# Patient Record
Sex: FEMALE | Race: WHITE | NOT HISPANIC OR LATINO
[De-identification: names, ages, dates, MRNs, and addresses within clinical notes are randomized per-mention and may not be internally consistent; named-entity substitution may affect disease eponyms.]

---

## 2024-09-17 PROBLEM — Z00.00 ENCOUNTER FOR PREVENTIVE HEALTH EXAMINATION: Status: ACTIVE | Noted: 2024-09-17

## 2024-09-18 ENCOUNTER — APPOINTMENT (OUTPATIENT)
Dept: ORTHOPEDIC SURGERY | Facility: CLINIC | Age: 64
End: 2024-09-18

## 2024-09-18 VITALS
SYSTOLIC BLOOD PRESSURE: 123 MMHG | BODY MASS INDEX: 22.58 KG/M2 | HEART RATE: 80 BPM | HEIGHT: 60 IN | WEIGHT: 115 LBS | DIASTOLIC BLOOD PRESSURE: 80 MMHG

## 2024-09-18 DIAGNOSIS — M75.41 IMPINGEMENT SYNDROME OF RIGHT SHOULDER: ICD-10-CM

## 2024-09-18 PROCEDURE — 20610 DRAIN/INJ JOINT/BURSA W/O US: CPT | Mod: RT

## 2024-09-18 PROCEDURE — 99203 OFFICE O/P NEW LOW 30 MIN: CPT | Mod: 25

## 2024-09-18 PROCEDURE — 73030 X-RAY EXAM OF SHOULDER: CPT | Mod: RT

## 2024-09-23 DIAGNOSIS — Z80.9 FAMILY HISTORY OF MALIGNANT NEOPLASM, UNSPECIFIED: ICD-10-CM

## 2024-09-23 DIAGNOSIS — Z78.9 OTHER SPECIFIED HEALTH STATUS: ICD-10-CM

## 2024-09-23 DIAGNOSIS — Z82.61 FAMILY HISTORY OF ARTHRITIS: ICD-10-CM

## 2024-09-23 PROBLEM — M75.41 IMPINGEMENT SYNDROME OF RIGHT SHOULDER: Status: ACTIVE | Noted: 2024-09-18

## 2024-09-24 NOTE — HISTORY OF PRESENT ILLNESS
[de-identified] : The patient comes in today with complaints of pain to her right shoulder. It has been going on for the past couple of years and it is getting worse recently. The patient states the onset/injury occurred in August of 2024. This injury is not work related or due to an automobile accident. The patient states the pain is intermittent and localized. The patient describes the pain as sharp, achy, shooting and stabbing. The patient notes heat makes her symptoms better.

## 2024-09-24 NOTE — HISTORY OF PRESENT ILLNESS
[de-identified] : The patient comes in today with complaints of pain to her right shoulder. It has been going on for the past couple of years and it is getting worse recently. The patient states the onset/injury occurred in August of 2024. This injury is not work related or due to an automobile accident. The patient states the pain is intermittent and localized. The patient describes the pain as sharp, achy, shooting and stabbing. The patient notes heat makes her symptoms better.

## 2024-09-24 NOTE — DISCUSSION/SUMMARY
[de-identified] : At this time, due to impingement of the right shoulder, I recommended a cortisone injection (as noted above), ice and elevation. She will be reassessed in three to four weeks.

## 2024-09-24 NOTE — PHYSICAL EXAM
[de-identified] : Right Shoulder:   Range of Motion in Degrees:                                                                      Claimant:                   Normal:    Abduction (Active)                                     180                     180 degrees    Abduction (Passive)                                  180                     180 degrees    Forward elevation (Active):                        180                     180 degrees    Forward elevation (Passive):                     180                     180 degrees    External rotation (Active):                           45                        45 degrees    External rotation (Passive):                        45                        45 degrees    Internal rotation (Active):                           L-1                             L-1    Internal rotation (Passive):                        L-1                             L-1    No motor weakness to internal rotation, external rotation or abduction in the scapular plane.  Negative crank test.  Negative OBriens test.  Negative Speeds test. Negative Yergasons test.  Negative cross arm test.  No tenderness to palpation at the AC joint. Positive Rush sign.  Positive Neers sign. Negative apprehension. Negative sulcus sign.  No gross neurological or vascular deficits distally.  Skin is intact.  No rashes, scars or lesions. 2+ radial and ulnar pulses. No extra-articular swelling or tenderness.  Left Shoulder: Range of Motion in Degrees:                                                                      Claimant:                   Normal:    Abduction (Active)                                     180                     180 degrees    Abduction (Passive)                                  180                     180 degrees    Forward elevation (Active):                        180                     180 degrees    Forward elevation (Passive):                     180                     180 degrees    External rotation (Active):                           45                        45 degrees    External rotation (Passive):                        45                        45 degrees    Internal rotation (Active):                           L-1                             L-1    Internal rotation (Passive):                        L-1                             L-1    No motor weakness to internal rotation, external rotation or abduction in the scapular plane.  Negative crank test.  Negative [place]'s test.  Negative Speeds test. Negative Yergason's test.  Negative cross arm test.  No tenderness to palpation at the AC joint. Negative Rush sign.  Negative Neers sign.  Negative apprehension. Negative sulcus sign.  No gross neurological or vascular deficits distally.  Skin is intact.  No rashes, scars or lesions. 2+ radial and ulnar pulses. No extra-articular swelling or tenderness.   [de-identified] : Ambulating with a normal gait. [de-identified] : Appearance:  Well-developed, well-nourished female in no acute distress.   [de-identified] : Radiographs, which were taken in the office today, two views of the right shoulder, reveal no obvious osseous abnormality.

## 2024-09-24 NOTE — PHYSICAL EXAM
[de-identified] : Right Shoulder:   Range of Motion in Degrees:                                                                      Claimant:                   Normal:    Abduction (Active)                                     180                     180 degrees    Abduction (Passive)                                  180                     180 degrees    Forward elevation (Active):                        180                     180 degrees    Forward elevation (Passive):                     180                     180 degrees    External rotation (Active):                           45                        45 degrees    External rotation (Passive):                        45                        45 degrees    Internal rotation (Active):                           L-1                             L-1    Internal rotation (Passive):                        L-1                             L-1    No motor weakness to internal rotation, external rotation or abduction in the scapular plane.  Negative crank test.  Negative OBriens test.  Negative Speeds test. Negative Yergasons test.  Negative cross arm test.  No tenderness to palpation at the AC joint. Positive Rush sign.  Positive Neers sign. Negative apprehension. Negative sulcus sign.  No gross neurological or vascular deficits distally.  Skin is intact.  No rashes, scars or lesions. 2+ radial and ulnar pulses. No extra-articular swelling or tenderness.  Left Shoulder: Range of Motion in Degrees:                                                                      Claimant:                   Normal:    Abduction (Active)                                     180                     180 degrees    Abduction (Passive)                                  180                     180 degrees    Forward elevation (Active):                        180                     180 degrees    Forward elevation (Passive):                     180                     180 degrees    External rotation (Active):                           45                        45 degrees    External rotation (Passive):                        45                        45 degrees    Internal rotation (Active):                           L-1                             L-1    Internal rotation (Passive):                        L-1                             L-1    No motor weakness to internal rotation, external rotation or abduction in the scapular plane.  Negative crank test.  Negative [place]'s test.  Negative Speeds test. Negative Yergason's test.  Negative cross arm test.  No tenderness to palpation at the AC joint. Negative Rush sign.  Negative Neers sign.  Negative apprehension. Negative sulcus sign.  No gross neurological or vascular deficits distally.  Skin is intact.  No rashes, scars or lesions. 2+ radial and ulnar pulses. No extra-articular swelling or tenderness.   [de-identified] : Ambulating with a normal gait. [de-identified] : Appearance:  Well-developed, well-nourished female in no acute distress.   [de-identified] : Radiographs, which were taken in the office today, two views of the right shoulder, reveal no obvious osseous abnormality.

## 2024-09-24 NOTE — DISCUSSION/SUMMARY
[de-identified] : At this time, due to impingement of the right shoulder, I recommended a cortisone injection (as noted above), ice and elevation. She will be reassessed in three to four weeks.

## 2024-09-24 NOTE — PROCEDURE
[de-identified] : Indication: Osteoarthritis of the right shoulder   Consent: At this time, I have recommended an injection into the right shoulder. The risks and benefits of the procedure were discussed with the patient in detail.  Upon verbal consent of the patient, we proceeded with the injection as noted below.   Procedure: After a sterile prep, the patient underwent an injection of 9 mL of 1% Lidocaine (10mg/mL) without epinephrine and 1 mL of triamcinolone acetonide (40mg/mL) into the right shoulder. The patient tolerated the procedure well.  There were no complications.   :  [employer] Drug name:     Triamcinolone Acetonide Injectable Suspension USP NDC #:            [address]-6 Lot #:               [id number] Expiration:      08/31/2025

## 2024-09-24 NOTE — PROCEDURE
[de-identified] : Indication: Osteoarthritis of the right shoulder   Consent: At this time, I have recommended an injection into the right shoulder. The risks and benefits of the procedure were discussed with the patient in detail.  Upon verbal consent of the patient, we proceeded with the injection as noted below.   Procedure: After a sterile prep, the patient underwent an injection of 9 mL of 1% Lidocaine (10mg/mL) without epinephrine and 1 mL of triamcinolone acetonide (40mg/mL) into the right shoulder. The patient tolerated the procedure well.  There were no complications.   :  [employer] Drug name:     Triamcinolone Acetonide Injectable Suspension [employer]P NDC #:            [address]-9 Lot #:               [id number] Expiration:      08/31/2025

## 2024-09-24 NOTE — ADDENDUM
[FreeTextEntry1] : This note was written by Amy Taylor on 09/23/2024 acting as a scribe for MARTHA HANSON III, MD